# Patient Record
Sex: MALE | Race: WHITE
[De-identification: names, ages, dates, MRNs, and addresses within clinical notes are randomized per-mention and may not be internally consistent; named-entity substitution may affect disease eponyms.]

---

## 2018-05-02 ENCOUNTER — HOSPITAL ENCOUNTER (OUTPATIENT)
Dept: HOSPITAL 17 - HCAV | Age: 38
End: 2018-05-02
Attending: PSYCHIATRY & NEUROLOGY
Payer: COMMERCIAL

## 2018-05-02 DIAGNOSIS — F31.60: Primary | ICD-10-CM

## 2018-05-02 PROCEDURE — 93005 ELECTROCARDIOGRAM TRACING: CPT

## 2018-05-03 NOTE — EKG
Date Performed: 05/02/2018       Time Performed: 12:28:28

 

PTAGE:      38 years

 

EKG:      Sinus rhythm 

 

. Normal ECG 

 

NO PREVIOUS TRACING            

 

DOCTOR:   Rita Zavala  Interpretating Date/Time  05/03/2018 13:59:33

## 2018-05-06 ENCOUNTER — HOSPITAL ENCOUNTER (EMERGENCY)
Dept: HOSPITAL 17 - NEPD | Age: 38
Discharge: HOME | End: 2018-05-06
Payer: COMMERCIAL

## 2018-05-06 DIAGNOSIS — M25.561: Primary | ICD-10-CM

## 2018-05-06 DIAGNOSIS — Z72.0: ICD-10-CM

## 2018-05-06 PROCEDURE — 99283 EMERGENCY DEPT VISIT LOW MDM: CPT

## 2018-05-06 PROCEDURE — 73564 X-RAY EXAM KNEE 4 OR MORE: CPT

## 2018-05-06 PROCEDURE — 96372 THER/PROPH/DIAG INJ SC/IM: CPT

## 2018-05-06 RX ADMIN — KETOROLAC TROMETHAMINE 1 MG: 30 INJECTION, SOLUTION INTRAMUSCULAR at 21:59

## 2018-06-11 ENCOUNTER — HOSPITAL ENCOUNTER (EMERGENCY)
Dept: HOSPITAL 17 - NED | Age: 38
Discharge: LEFT BEFORE DISCHARGE | End: 2018-06-11
Payer: COMMERCIAL

## 2018-06-11 VITALS
OXYGEN SATURATION: 98 % | TEMPERATURE: 98 F | SYSTOLIC BLOOD PRESSURE: 119 MMHG | DIASTOLIC BLOOD PRESSURE: 70 MMHG | HEART RATE: 92 BPM | RESPIRATION RATE: 16 BRPM

## 2018-06-11 VITALS — BODY MASS INDEX: 23.03 KG/M2 | HEIGHT: 66 IN | WEIGHT: 143.3 LBS

## 2018-06-11 DIAGNOSIS — R29.90: Primary | ICD-10-CM

## 2018-06-11 PROCEDURE — 99281 EMR DPT VST MAYX REQ PHY/QHP: CPT

## 2018-06-12 ENCOUNTER — HOSPITAL ENCOUNTER (EMERGENCY)
Dept: HOSPITAL 17 - NEPD | Age: 38
LOS: 1 days | Discharge: HOME | End: 2018-06-13
Payer: COMMERCIAL

## 2018-06-12 VITALS
SYSTOLIC BLOOD PRESSURE: 123 MMHG | HEART RATE: 82 BPM | OXYGEN SATURATION: 100 % | TEMPERATURE: 98.4 F | DIASTOLIC BLOOD PRESSURE: 82 MMHG | RESPIRATION RATE: 18 BRPM

## 2018-06-12 VITALS — BODY MASS INDEX: 24.8 KG/M2 | HEIGHT: 66 IN | WEIGHT: 154.32 LBS

## 2018-06-12 DIAGNOSIS — Z72.0: ICD-10-CM

## 2018-06-12 DIAGNOSIS — H57.8: Primary | ICD-10-CM

## 2018-06-12 PROCEDURE — 99283 EMERGENCY DEPT VISIT LOW MDM: CPT

## 2018-06-12 NOTE — PD
HPI


Chief Complaint:  Foreign Body


Time Seen by Provider:  22:40


Travel History


International Travel<30 days:  No


Contact w/Intl Traveler<30days:  No


Traveled to known affect area:  No





History of Present Illness


HPI


Patient 38-year-old male presents emergency department after having eye 

irritation since yesterday.  Patient states she was working on a tool for what 

his description as he was trying to fit a bit inside the motorized  when 

it slipped and through some grinding metal powder into the air and low 

velocity.  He states he was not grinding metal and not using a grinding wheel.  

He states he has had a foreign body sensation in his left eye ever since.  He 

has had a little blurred vision which is actually gotten a lot does not wear 

glasses does not wear contacts.  States symptoms are fairly mild, no pain just 

a foreign body sensation, context and duration as above.





PF[facility]


Past Medical History


Medical History:  Denies Significant Hx


Diminished Hearing:  No


Immunizations Current:  Yes


Tetanus Vaccination:  Unknown


Influenza Vaccination:  No





Past Surgical History


Oral Surgery:  Yes





Social History


Alcohol Use:  Yes


Tobacco Use:  Yes


Substance Use:  No





Allergies-Medications


(Allergen,Severity, Reaction):  


Coded Allergies:  


     No Known Allergies (Unverified , 6/12/18)


Reported Meds & Prescriptions





Reported Meds & Active Scripts


Active


Reported


Seroquel (Quetiapine Fumarate) 25 Mg Tab 25 Mg PO BID


Depakote DR (Divalproex Sodium) 125 Mg Tabdr 125 Mg PO BID


Cymbalta DR (Duloxetine HCl) 20 Mg Capdr 20 Mg PO DAILY








Review of Systems


Except as stated in HPI:  all other systems reviewed are Neg





Physical Exam


Narrative


GENERAL: Well-nourished, well-developed patient.


SKIN: Focused skin assessment warm/dry.


HEAD: Normocephalic.


EYES: No scleral icterus. No injection or drainage.  Pupils are equal round 

reactive to light and about 4 mm, patient does have a lesion on the left which 

states is baseline.  He has difficulty with upward gaze and lateral gaze.  Slit 

lamp examination with palpable lens with fluorescein staining does not show any 

uptake, no foreign body has been visualized, there is no scleral injection or 

icterus, there is no cell or flare


NECK: Supple, trachea midline. No JVD or lymphadenopathy.


CARDIOVASCULAR: Regular rate and rhythm without murmurs, gallops, or rubs. 


RESPIRATORY: Breath sounds equal bilaterally. No accessory muscle use.


GASTROINTESTINAL: Abdomen soft, non-tender, nondistended. 


MUSCULOSKELETAL: No cyanosis, or edema. 


BACK: Nontender without obvious deformity. No CVA tenderness.





Data


Data


Last Documented VS





Vital Signs








  Date Time  Temp Pulse Resp B/P (MAP) Pulse Ox O2 Delivery O2 Flow Rate FiO2


 


6/12/18 21:29 98.4 82 18 123/82 (96) 100   








Orders





 Orders


Proparacaine 0.5% Opth Soln (Alcaine 0.5 (6/12/18 22:45)


Ed Discharge Order (6/12/18 23:24)








[facility]


Medical Decision Making


Medical Screen Exam Complete:  Yes


Emergency Medical Condition:  Yes


Differential Diagnosis


Foreign body sensation, conjunctivitis unlikely, open globe excluded clinically.


Narrative Course


Patient room to the emergency department, has reassuring slit-lamp exam, normal 

vision and equal pupils.  There is no indication further workup this patient at 

this time.  He is stable for discharge.





Diagnosis





 Primary Impression:  


 Sensation of foreign body in eye


Disposition:  01 DISCHARGE HOME


Condition:  Stable











Kervin So MD Jun 12, 2018 23:24